# Patient Record
Sex: MALE | Race: WHITE
[De-identification: names, ages, dates, MRNs, and addresses within clinical notes are randomized per-mention and may not be internally consistent; named-entity substitution may affect disease eponyms.]

---

## 2020-11-05 ENCOUNTER — HOSPITAL ENCOUNTER (EMERGENCY)
Dept: HOSPITAL 94 - ER | Age: 35
LOS: 1 days | Discharge: HOME | End: 2020-11-06
Payer: COMMERCIAL

## 2020-11-05 VITALS — BODY MASS INDEX: 35.4 KG/M2 | WEIGHT: 225.53 LBS | HEIGHT: 67 IN

## 2020-11-05 DIAGNOSIS — F10.129: Primary | ICD-10-CM
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
## 2020-11-05 PROCEDURE — 99283 EMERGENCY DEPT VISIT LOW MDM: CPT

## 2020-11-06 VITALS — SYSTOLIC BLOOD PRESSURE: 115 MMHG | DIASTOLIC BLOOD PRESSURE: 85 MMHG

## 2024-02-22 NOTE — NUR
PT AMBULATED UP TO USE BATHROOM. AMBULATED WITHOUT DIFFICULTY AND HAD STEADY 
GAIT. PT THEN RETURNED TO BED WITHOUT ISSUE. WILL CONTINUE TO MONITOR PT AND 
ENCOURAGE FOOD AND FLUIDS. CRACKERS AND STRING CHEESE PROVIDED AT PT REQUEST
PT GIVEN LARGE PITCHER OF WATER AND ENCOURAGED TO DRINK IT. PT AGREED AND IS 
GONIG TO REST AND WAIT FOR DISCHARGE UNTIL RESTED APPROPRIATELY
normal...